# Patient Record
Sex: MALE
[De-identification: names, ages, dates, MRNs, and addresses within clinical notes are randomized per-mention and may not be internally consistent; named-entity substitution may affect disease eponyms.]

---

## 2024-04-30 ENCOUNTER — TRANSCRIBE ORDERS (OUTPATIENT)
Dept: CALL CENTER | Age: 39
End: 2024-04-30

## 2024-04-30 DIAGNOSIS — R53.83 OTHER FATIGUE: Primary | ICD-10-CM

## 2024-05-01 ENCOUNTER — TELEPHONE (OUTPATIENT)
Dept: SLEEP MEDICINE | Facility: CLINIC | Age: 39
End: 2024-05-01

## 2024-05-01 NOTE — TELEPHONE ENCOUNTER
Reason for Call:  Appointment Request    Patient requesting this type of appt:  Sleep consult    Requested provider: Faby Vieyra     Reason patient unable to be scheduled: Needs to be scheduled by clinic    When does patient want to be seen/preferred time:  n.a    Comments: Please call patient to schedule. [facility] acct  [id number]     5/1/2024 at 3:15 PM by Zahraa Lopez

## 2024-09-05 ENCOUNTER — OFFICE VISIT (OUTPATIENT)
Dept: SLEEP MEDICINE | Facility: CLINIC | Age: 39
End: 2024-09-05
Payer: COMMERCIAL

## 2024-09-05 VITALS
SYSTOLIC BLOOD PRESSURE: 120 MMHG | DIASTOLIC BLOOD PRESSURE: 84 MMHG | HEART RATE: 77 BPM | BODY MASS INDEX: 29.12 KG/M2 | HEIGHT: 71 IN | OXYGEN SATURATION: 98 % | WEIGHT: 208 LBS

## 2024-09-05 DIAGNOSIS — R06.83 SNORING: Primary | ICD-10-CM

## 2024-09-05 PROCEDURE — 99203 OFFICE O/P NEW LOW 30 MIN: CPT | Performed by: PHYSICIAN ASSISTANT

## 2024-09-05 NOTE — PROGRESS NOTES
Outpatient Sleep Medicine Consultation:      Name: Shaji Peñaloza MRN# [id number]   Age: 38 year old YOB: 1985     Date of Consultation: September 5, 2024  Consultation is requested by: No referring provider defined for this encounter. No ref. provider found  Primary care provider: No primary care provider on file.       Reason for Sleep Consult:     Shaji Peñaloza is sent by No ref. provider found for a sleep consultation regarding 1. Other fatigue    Chief Complaint   Patient presents with    Sleep Problem     [facility] referred because I said I snore, trouble sleeping , Improved since having my deviated septum repaired.          Assessment and Plan:     1. Snoring  Patient presents to clinic today after referral for evaluation of snoring, fatigue and concern for possible underlying ROSENDO.  Patient had septoplasty procedure completed June 2024 and snoring and fatigue significantly improved since this time.  He still will have some intermittent mild snoring but not disruptive.  No witnessed apneas by his .  Denies any daytime sleepiness.  We reviewed pathophysiology of ROSENDO and discussed that his risk of significant ROSENDO appears to be quite low at this time.  Discussed we could certainly consider doing a sleep study but my assumption would be it would come back normal and patient is in agreement, not particularly excited about doing a sleep study either given he is overall happy with his sleep.  He will keep an eye on his symptoms over time and return to clinic if they progress.  Follow-up as needed.           History of Present Illness:     Shaji Peñaloza is a 38 year old male who presents to clinic today for evaluation of possible ROSENDO.  Patient reports he was having some difficulty with snoring and daytime fatigue but this has essentially resolved since he had septoplasty in June 2024.  He can still occasionally snore but not very loud and not disruptive to bed partner or himself.  He used to wake  "himself up snoring but again this is resolved.  Has woken himself up gasping in the past but this is rare, \"once in a blue moon\".  No witnessed sleep apnea events.  Denies morning headaches.  No nocturnal GERD.    Sleep schedule: During workweek, bedtime 11:30-12:00AM and wakes for the day usually around 7:00AM with partner and may/may not sleep another 30 minutes after partner gets up.  On weekends bedtime is the same but normally wakes around 8:00 AM. Sleep latency estimated to be 5-10 minutes.  Wakes typically 3-4 times per night, once for the restroom and other times typically just to change sleeping positions,usually returns to sleep 5-10 minutes.  \"Once in a blue moon\" he can have prolonged arousal and take 1-2 hours to return to sleep.  Patient typically sleeps on his sides.  He works as a jeweler.    Denies any planned daytime napping on regular basis, rarely on a weekend. No inadvertent dozing. He had a total [place] Sleepiness Scale of 1 (09/05/24 0800), with scores of 10 or higher indicating abnormal levels of sleepiness.     Patient reports PTSD related nightmares \"it was all the time when I was in the [employer] but now that I am retired it is not that often\".  Has woken up yelling from a dream in the past, cannot recall last time, this is \"once in a blue moon\".    Denies restless legs syndrome symptoms.  Denies any kicking/punching in sleep.  Rare sleep talking, mumbling.  Had episodes of sleepwalking in childhood but never in adulthood.  History of bruxism per dentist, no guard.    Caffeine: Drinks 2 to 3 cups of coffee in the morning and may have a soda with lunch, no caffeine after 4 PM.  Denies alcohol near bedtime or uses sleep aid.  Historically [facility] some [facility] when he complained of occasional insomnia but he cannot recall what it was and he does not recall benefit.    Family History:  Patient has a family member been diagnosed with a sleep disorder:  Dad and uncle " with ROSENDO    SCALES:    EPWORTH SLEEPINESS SCALE         9/5/2024     8:00 AM    Boyden Sleepiness Scale ( ARNEL Pearson  [id number]<br>ESS - [place]/English - Final version - 21 Nov 07 - [facility] [place].)   Boyden Score (Sleep) 1       INSOMNIA SEVERITY INDEX ([place])          9/5/2024     8:00 AM   Insomnia Severity Index ([place])   Difficulty falling asleep 1   Difficulty staying asleep 1   Problems waking up too early 2   How SATISFIED/DISSATISFIED are you with your CURRENT sleep pattern? 0   How NOTICEABLE to others do you think your sleep problem is in terms of impairing the quality of your life? 0   How WORRIED/DISTRESSED are you about your current sleep problem? 0   To what extent do you consider your sleep problem to INTERFERE with your daily functioning (e.g. daytime fatigue, mood, ability to function at work/daily chores, concentration, memory, mood, etc.) CURRENTLY? 1   [place] Total Score 5       Guidelines for Scoring/Interpretation:  Total score categories:  0-7 = No clinically significant insomnia   8-14 = Subthreshold insomnia   15-21 = Clinical insomnia (moderate severity)  22-28 = Clinical insomnia (severe)  Used via courtesy of www.[employer].va.gov with permission from Scott Phoenix PhD., [facility]      Allergies:    No Known Allergies    Medications:    No current outpatient medications on file.       Problem List:  There are no problems to display for this patient.       Past Medical/Surgical History:  No past medical history on file.  Past Surgical History:   Procedure Laterality Date    SEPTOPLASTY      6/2024       Social History:  Social History     Socioeconomic History    Marital status: Single     Spouse name: Not on file    Number of children: Not on file    Years of education: Not on file    Highest education level: Not on file   Occupational History    Not on file   Tobacco Use    Smoking status: Never    Smokeless tobacco: Former     Types: Chew   Vaping Use    Vaping status: Never  "Used   Substance and Sexual Activity    Alcohol use: Not on file    Drug use: Not on file    Sexual activity: Not on file   Other Topics Concern    Not on file   Social History Narrative    Not on file     Social Determinants of Health     Financial Resource Strain: Not on file   Food Insecurity: Not on file   Transportation Needs: Not on file   Physical Activity: Not on file   Stress: Not on file   Social Connections: Not on file   Interpersonal Safety: Not on file   Housing Stability: Not on file       Family History:  No family history on file.    Physical Examination:  Vitals: Ht 1.803 m (5' 11\")   Wt 94.3 kg (208 lb)   BMI 29.01 kg/m    BMI= Body mass index is 29.01 kg/m .  General appearance: Awake, alert, cooperative. Well groomed. Sitting comfortably in chair. In no apparent distress.  HEENT: Head: Normocephalic, atraumatic. Eyes: PERRL. Conjunctiva clear. Sclera normal. Nose: External appearance normal. Mallampati Classification:II. Tonsils:2  visible at pillars.   Neck: Neck Cir (cm): 40 cm (9/5/2024  8:00 AM)  Skin: No rashes or significant lesions.   Neurologic: Alert, oriented x3. No focal neurological deficit. Gait normal.   Psychiatric: Mood euthymic. Affect congruent with full range and intensity.           Data: All pertinent previous laboratory data reviewed     No results for input(s): \"NA\", \"POTASSIUM\", \"CHLORIDE\", \"CO2\", \"ANIONGAP\", \"GLC\", \"BUN\", \"CR\", \"MONISHA\" in the last 60965 hours.    No results for input(s): \"WBC\", \"RBC\", \"HGB\", \"HCT\", \"MCV\", \"MCH\", \"MCHC\", \"RDW\", \"PLT\" in the last 56400 hours.    No results for input(s): \"PROTTOTAL\", \"ALBUMIN\", \"BILITOTAL\", \"ALKPHOS\", \"AST\", \"ALT\", \"BILIDIRECT\" in the last 94475 hours.    No results found for: \"TSH\"    No results found for: \"UAMP\", \"UBARB\", \"BENZODIAZEUR\", \"UCANN\", \"UCOC\", \"OPIT\", \"UPCP\"    No results found for: \"IRONSAT\", \"RN74124\", \"TERESA\"    No results found for: \"PH\", \"PHARTERIAL\", \"PO2\", \"PC6VCSABGTQ\", \"SAT\", \"PCO2\", \"HCO3\", " "\"BASEEXCESS\", \"JANE\", \"BEB\"    @LABRCNTIPR(phv:4,pco2v:4,po2v:4,hco3v:4,guerline:4,o2per:4)@    Echocardiology: No results found for this or any previous visit (from the past 4320 hour(s)).    Chest x-ray: No results found for this or any previous visit from the past 365 days.      Chest CT: No results found for this or any previous visit from the past 365 days.      PFT: Most Recent [facility] Pulmonary Function Testing    No results found for: \"20001\"  No results found for: \"20002\"  No results found for: \"20003\"  No results found for: \"20015\"  No results found for: \"20016\"  No results found for: \"20027\"  No results found for: \"20028\"  No results found for: \"20029\"  No results found for: \"20079\"  No results found for: \"20080\"  No results found for: \"20081\"  No results found for: \"20335\"  No results found for: \"20105\"  No results found for: \"20053\"  No results found for: \"20054\"  No results found for: \"20055\"      Chelo Crawford PA-C 9/5/2024     [facility]  [address] [address]     [facility]  [address] [address]    Chart documentation was completed, in part, with [employer] voice-recognition software. Even though reviewed, some grammatical, spelling, and word errors may remain.    36 minutes spent on day of encounter reviewing medical records, history and physical examination, review of previous testing and interpretation, documentation and further activities as noted above  "

## 2024-09-05 NOTE — NURSING NOTE
"Chief Complaint   Patient presents with    Sleep Problem     [facility] referred because I said I snore, trouble sleeping , Improved since having my deviated septum repaired.       Initial /84   Pulse 77   Ht 1.803 m (5' 11\")   Wt 94.3 kg (208 lb)   SpO2 98%   BMI 29.01 kg/m   Estimated body mass index is 29.01 kg/m  as calculated from the following:    Height as of this encounter: 1.803 m (5' 11\").    Weight as of this encounter: 94.3 kg (208 lb).    Medication Reconciliation: complete  ESS 1  Neck circumference: 40 centimeters.  Leigh Chisholm MA   "